# Patient Record
Sex: MALE | HISPANIC OR LATINO | Employment: UNEMPLOYED | URBAN - NONMETROPOLITAN AREA
[De-identification: names, ages, dates, MRNs, and addresses within clinical notes are randomized per-mention and may not be internally consistent; named-entity substitution may affect disease eponyms.]

---

## 2017-08-10 ENCOUNTER — TELEPHONE (OUTPATIENT)
Dept: FAMILY MEDICINE CLINIC | Facility: CLINIC | Age: 22
End: 2017-08-10

## 2017-08-10 ENCOUNTER — APPOINTMENT (OUTPATIENT)
Dept: GENERAL RADIOLOGY | Facility: HOSPITAL | Age: 22
End: 2017-08-10

## 2017-08-10 ENCOUNTER — APPOINTMENT (OUTPATIENT)
Dept: CT IMAGING | Facility: HOSPITAL | Age: 22
End: 2017-08-10

## 2017-08-10 ENCOUNTER — HOSPITAL ENCOUNTER (EMERGENCY)
Facility: HOSPITAL | Age: 22
Discharge: HOME | End: 2017-08-10
Attending: EMERGENCY MEDICINE | Admitting: EMERGENCY MEDICINE

## 2017-08-10 VITALS
RESPIRATION RATE: 17 BRPM | DIASTOLIC BLOOD PRESSURE: 59 MMHG | HEART RATE: 83 BPM | OXYGEN SATURATION: 98 % | WEIGHT: 152 LBS | SYSTOLIC BLOOD PRESSURE: 114 MMHG | TEMPERATURE: 99.1 F

## 2017-08-10 DIAGNOSIS — R10.9 ABDOMINAL PAIN, UNSPECIFIED LOCATION: Primary | ICD-10-CM

## 2017-08-10 DIAGNOSIS — R11.2 NON-INTRACTABLE VOMITING WITH NAUSEA, UNSPECIFIED VOMITING TYPE: ICD-10-CM

## 2017-08-10 LAB
ALBUMIN SERPL-MCNC: 5 G/DL (ref 3.4–4.8)
ALBUMIN/GLOB SERPL: 1.6 G/DL (ref 1.1–1.8)
ALP SERPL-CCNC: 126 U/L (ref 38–126)
ALT SERPL W P-5'-P-CCNC: 56 U/L (ref 21–72)
ANION GAP SERPL CALCULATED.3IONS-SCNC: 15 MMOL/L (ref 5–15)
AST SERPL-CCNC: 59 U/L (ref 17–59)
BASOPHILS # BLD AUTO: 0.04 10*3/MM3 (ref 0–0.2)
BASOPHILS NFR BLD AUTO: 0.3 % (ref 0–2)
BILIRUB SERPL-MCNC: 2.1 MG/DL (ref 0.2–1.3)
BUN BLD-MCNC: 10 MG/DL (ref 7–21)
BUN/CREAT SERPL: 11.9 (ref 7–25)
CALCIUM SPEC-SCNC: 9.4 MG/DL (ref 8.4–10.2)
CHLORIDE SERPL-SCNC: 102 MMOL/L (ref 95–110)
CO2 SERPL-SCNC: 22 MMOL/L (ref 22–31)
CREAT BLD-MCNC: 0.84 MG/DL (ref 0.7–1.3)
DEPRECATED RDW RBC AUTO: 39.9 FL (ref 35.1–43.9)
EOSINOPHIL # BLD AUTO: 0.2 10*3/MM3 (ref 0–0.7)
EOSINOPHIL NFR BLD AUTO: 1.7 % (ref 0–7)
ERYTHROCYTE [DISTWIDTH] IN BLOOD BY AUTOMATED COUNT: 13.3 % (ref 11.5–14.5)
GFR SERPL CREATININE-BSD FRML MDRD: 116 ML/MIN/1.73 (ref 77–179)
GFR SERPL CREATININE-BSD FRML MDRD: 141 ML/MIN/1.73 (ref 77–179)
GLOBULIN UR ELPH-MCNC: 3.2 GM/DL (ref 2.3–3.5)
GLUCOSE BLD-MCNC: 107 MG/DL (ref 60–100)
HCT VFR BLD AUTO: 44.2 % (ref 39–49)
HGB BLD-MCNC: 16.2 G/DL (ref 13.7–17.3)
HOLD SPECIMEN: NORMAL
HOLD SPECIMEN: NORMAL
IMM GRANULOCYTES # BLD: 0.04 10*3/MM3 (ref 0–0.02)
IMM GRANULOCYTES NFR BLD: 0.3 % (ref 0–0.5)
LIPASE SERPL-CCNC: 64 U/L (ref 23–300)
LYMPHOCYTES # BLD AUTO: 1.86 10*3/MM3 (ref 0.6–4.2)
LYMPHOCYTES NFR BLD AUTO: 16.1 % (ref 10–50)
MCH RBC QN AUTO: 30.1 PG (ref 26.5–34)
MCHC RBC AUTO-ENTMCNC: 36.7 G/DL (ref 31.5–36.3)
MCV RBC AUTO: 82.2 FL (ref 80–98)
MONOCYTES # BLD AUTO: 0.81 10*3/MM3 (ref 0–0.9)
MONOCYTES NFR BLD AUTO: 7 % (ref 0–12)
NEUTROPHILS # BLD AUTO: 8.61 10*3/MM3 (ref 2–8.6)
NEUTROPHILS NFR BLD AUTO: 74.6 % (ref 37–80)
PLATELET # BLD AUTO: 259 10*3/MM3 (ref 150–450)
PMV BLD AUTO: 9.4 FL (ref 8–12)
POTASSIUM BLD-SCNC: 3.5 MMOL/L (ref 3.5–5.1)
PROT SERPL-MCNC: 8.2 G/DL (ref 6.3–8.6)
RBC # BLD AUTO: 5.38 10*6/MM3 (ref 4.37–5.74)
RBC MORPH BLD: NORMAL
SMALL PLATELETS BLD QL SMEAR: ADEQUATE
SODIUM BLD-SCNC: 139 MMOL/L (ref 137–145)
WBC MORPH BLD: NORMAL
WBC NRBC COR # BLD: 11.56 10*3/MM3 (ref 3.2–9.8)
WHOLE BLOOD HOLD SPECIMEN: NORMAL
WHOLE BLOOD HOLD SPECIMEN: NORMAL

## 2017-08-10 PROCEDURE — 96375 TX/PRO/DX INJ NEW DRUG ADDON: CPT

## 2017-08-10 PROCEDURE — 25010000002 PROMETHAZINE PER 50 MG: Performed by: EMERGENCY MEDICINE

## 2017-08-10 PROCEDURE — 99284 EMERGENCY DEPT VISIT MOD MDM: CPT

## 2017-08-10 PROCEDURE — 25010000002 HYDROMORPHONE PER 4 MG: Performed by: EMERGENCY MEDICINE

## 2017-08-10 PROCEDURE — 0 IOPAMIDOL 61 % SOLUTION: Performed by: EMERGENCY MEDICINE

## 2017-08-10 PROCEDURE — 85007 BL SMEAR W/DIFF WBC COUNT: CPT | Performed by: EMERGENCY MEDICINE

## 2017-08-10 PROCEDURE — 96374 THER/PROPH/DIAG INJ IV PUSH: CPT

## 2017-08-10 PROCEDURE — 80053 COMPREHEN METABOLIC PANEL: CPT | Performed by: EMERGENCY MEDICINE

## 2017-08-10 PROCEDURE — 74177 CT ABD & PELVIS W/CONTRAST: CPT

## 2017-08-10 PROCEDURE — 25010000002 ONDANSETRON PER 1 MG: Performed by: EMERGENCY MEDICINE

## 2017-08-10 PROCEDURE — 74022 RADEX COMPL AQT ABD SERIES: CPT

## 2017-08-10 PROCEDURE — 25010000002 KETOROLAC TROMETHAMINE PER 15 MG: Performed by: EMERGENCY MEDICINE

## 2017-08-10 PROCEDURE — 96361 HYDRATE IV INFUSION ADD-ON: CPT

## 2017-08-10 PROCEDURE — 83690 ASSAY OF LIPASE: CPT | Performed by: EMERGENCY MEDICINE

## 2017-08-10 PROCEDURE — 85025 COMPLETE CBC W/AUTO DIFF WBC: CPT | Performed by: EMERGENCY MEDICINE

## 2017-08-10 RX ORDER — PROMETHAZINE HYDROCHLORIDE 25 MG/ML
6.25 INJECTION, SOLUTION INTRAMUSCULAR; INTRAVENOUS ONCE
Status: COMPLETED | OUTPATIENT
Start: 2017-08-10 | End: 2017-08-10

## 2017-08-10 RX ORDER — SODIUM CHLORIDE 0.9 % (FLUSH) 0.9 %
10 SYRINGE (ML) INJECTION AS NEEDED
Status: DISCONTINUED | OUTPATIENT
Start: 2017-08-10 | End: 2017-08-10 | Stop reason: HOSPADM

## 2017-08-10 RX ORDER — DICYCLOMINE HCL 20 MG
20 TABLET ORAL EVERY 6 HOURS PRN
Qty: 20 TABLET | Refills: 0 | Status: SHIPPED | OUTPATIENT
Start: 2017-08-10

## 2017-08-10 RX ORDER — ONDANSETRON 4 MG/1
4 TABLET, ORALLY DISINTEGRATING ORAL EVERY 6 HOURS PRN
Qty: 12 TABLET | Refills: 0 | Status: SHIPPED | OUTPATIENT
Start: 2017-08-10

## 2017-08-10 RX ORDER — KETOROLAC TROMETHAMINE 30 MG/ML
30 INJECTION, SOLUTION INTRAMUSCULAR; INTRAVENOUS ONCE
Status: COMPLETED | OUTPATIENT
Start: 2017-08-10 | End: 2017-08-10

## 2017-08-10 RX ORDER — ONDANSETRON 2 MG/ML
4 INJECTION INTRAMUSCULAR; INTRAVENOUS ONCE
Status: COMPLETED | OUTPATIENT
Start: 2017-08-10 | End: 2017-08-10

## 2017-08-10 RX ADMIN — IOPAMIDOL 80 ML: 612 INJECTION, SOLUTION INTRAVENOUS at 08:26

## 2017-08-10 RX ADMIN — ONDANSETRON 4 MG: 2 INJECTION INTRAMUSCULAR; INTRAVENOUS at 07:19

## 2017-08-10 RX ADMIN — SODIUM CHLORIDE 1000 ML: 9 INJECTION, SOLUTION INTRAVENOUS at 07:19

## 2017-08-10 RX ADMIN — KETOROLAC TROMETHAMINE 30 MG: 30 INJECTION, SOLUTION INTRAMUSCULAR; INTRAVENOUS at 07:19

## 2017-08-10 RX ADMIN — PROMETHAZINE HYDROCHLORIDE 6.25 MG: 25 INJECTION INTRAMUSCULAR; INTRAVENOUS at 08:34

## 2017-08-10 RX ADMIN — HYDROMORPHONE HYDROCHLORIDE 1 MG: 1 INJECTION, SOLUTION INTRAMUSCULAR; INTRAVENOUS; SUBCUTANEOUS at 08:34

## 2017-08-10 NOTE — DISCHARGE INSTRUCTIONS
Followup with the family medicine clinic in three days if symptoms not improved.  Return with any new or worsening symptoms, or any concerns.

## 2017-08-10 NOTE — ED NOTES
C/o abdominal/epigastric pain and nausea since last night.      Lucia Jarquin, RNA  08/10/17 0707

## 2017-08-10 NOTE — ED PROVIDER NOTES
Subjective   HPI Comments: Patient with left sided abdominal discomfort since last evening.  Patient states he has had some dry heaves, though has not had actual vomiting.  No f/c.  No prior surgical history.  Patient has been able to move his bowels.  No prior history of the same.        History provided by:  Patient   used: No        Review of Systems   Constitutional: Negative.  Negative for appetite change, chills and fever.   HENT: Negative.  Negative for congestion.    Eyes: Negative.  Negative for photophobia and visual disturbance.   Respiratory: Negative.  Negative for cough, chest tightness and shortness of breath.    Cardiovascular: Negative.  Negative for chest pain and palpitations.   Gastrointestinal: Positive for abdominal pain, nausea and vomiting. Negative for constipation and diarrhea.   Endocrine: Negative.    Genitourinary: Negative.  Negative for decreased urine volume, dysuria, flank pain and hematuria.   Musculoskeletal: Negative.  Negative for arthralgias, back pain, myalgias, neck pain and neck stiffness.   Skin: Negative.  Negative for pallor.   Neurological: Negative.  Negative for dizziness, syncope, weakness, light-headedness, numbness and headaches.   Psychiatric/Behavioral: Negative.  Negative for confusion and suicidal ideas. The patient is not nervous/anxious.    All other systems reviewed and are negative.      Past Medical History:   Diagnosis Date   • Epistaxis        No Known Allergies    History reviewed. No pertinent surgical history.    History reviewed. No pertinent family history.    Social History     Social History   • Marital status: Single     Spouse name: N/A   • Number of children: N/A   • Years of education: N/A     Social History Main Topics   • Smoking status: Heavy Tobacco Smoker     Packs/day: 0.50   • Smokeless tobacco: None   • Alcohol use Yes   • Drug use: No   • Sexual activity: Defer     Other Topics Concern   • None     Social History  Narrative   • None           Objective   Physical Exam   Constitutional: He is oriented to person, place, and time. He appears well-developed and well-nourished. No distress.   HENT:   Head: Normocephalic and atraumatic.   Eyes: Conjunctivae and EOM are normal.   Neck: Normal range of motion. Neck supple. No JVD present.   Cardiovascular: Normal rate, regular rhythm, normal heart sounds and intact distal pulses.  Exam reveals no gallop and no friction rub.    No murmur heard.  Pulmonary/Chest: Effort normal. No respiratory distress. He has no wheezes. He has no rales. He exhibits no tenderness.   Abdominal: Soft. Bowel sounds are normal. He exhibits no distension and no mass. There is tenderness (LUQ, mild left periumbilical.  No peritoneal signs.  Soft abdomen). There is no rebound and no guarding.   Musculoskeletal: Normal range of motion.   Neurological: He is alert and oriented to person, place, and time.   Skin: Skin is warm and dry.   Psychiatric: He has a normal mood and affect. His behavior is normal. Judgment and thought content normal.   Nursing note and vitals reviewed.      Procedures         ED Course  ED Course      Labs Reviewed   COMPREHENSIVE METABOLIC PANEL - Abnormal; Notable for the following:        Result Value    Glucose 107 (*)     Albumin 5.00 (*)     Total Bilirubin 2.1 (*)     All other components within normal limits   CBC WITH AUTO DIFFERENTIAL - Abnormal; Notable for the following:     WBC 11.56 (*)     MCHC 36.7 (*)     Neutrophils, Absolute 8.61 (*)     Immature Grans, Absolute 0.04 (*)     All other components within normal limits   LIPASE - Normal   RAINBOW DRAW    Narrative:     The following orders were created for panel order [facility] Draw.  Procedure                               Abnormality         Status                     ---------                               -----------         ------                     Light Blue Top[[id number]]                                   Final  result               Green Top (Gel)[[id number]]                                  Final result               Lavender Top[[id number]]                                     Final result               Gold Top - SST[[id number]]                                   Final result                 Please view results for these tests on the individual orders.   SCAN SLIDE   URINALYSIS W/ CULTURE IF INDICATED   CBC AND DIFFERENTIAL    Narrative:     The following orders were created for panel order CBC & Differential.  Procedure                               Abnormality         Status                     ---------                               -----------         ------                     Scan Slide[[id number]]                                       Final result               CBC Auto Differential[[id number]]        Abnormal            Final result                 Please view results for these tests on the individual orders.   LIGHT BLUE TOP   GREEN TOP   LAVENDER TOP   GOLD TOP - SST       CT Abdomen Pelvis With Contrast   Final Result   CONCLUSION: Unremarkable CT abdomen pelvis performed with IV   contrast.       Electronically signed by:  Surya Ospina MD  8/10/2017 8:48 AM CDT   Workstation: [id number]      XR Abdomen 2 View With Chest 1 View   Final Result   Negative acute abdominal series with chest x-ray.      [id number]      Electronically signed by:  Franklyn Taylor MD  8/10/2017 7:55   AM CDT Workstation: [id number]        Nontoxic appearing, no distress, improved with ED interventions.  Outpatient followup if not improved with meds.              MDM    Final diagnoses:   Abdominal pain, unspecified location   Non-intractable vomiting with nausea, unspecified vomiting type            Gildardo Flanagan MD  08/10/17 0918

## 2017-08-10 NOTE — TELEPHONE ENCOUNTER
TRIED TO MAKE AN ER FOLLOW UP, BUT THE PERSON WHO ANSWERED DIDN'T SPEAK ENGLISH AND COULDN'T UNDERSTAND ME.